# Patient Record
Sex: FEMALE | URBAN - METROPOLITAN AREA
[De-identification: names, ages, dates, MRNs, and addresses within clinical notes are randomized per-mention and may not be internally consistent; named-entity substitution may affect disease eponyms.]

---

## 2023-11-01 ENCOUNTER — ANCILLARY PROCEDURE (OUTPATIENT)
Dept: RADIOLOGY | Facility: CLINIC | Age: 53
End: 2023-11-01
Payer: COMMERCIAL

## 2023-11-01 DIAGNOSIS — Z13.6 ENCOUNTER FOR SCREENING FOR CARDIOVASCULAR DISORDERS: ICD-10-CM

## 2023-11-01 PROCEDURE — 75571 CT HRT W/O DYE W/CA TEST: CPT

## 2023-11-03 ENCOUNTER — TELEPHONE (OUTPATIENT)
Dept: PRIMARY CARE | Facility: CLINIC | Age: 53
End: 2023-11-03
Payer: COMMERCIAL

## 2023-11-03 PROBLEM — R73.01 IMPAIRED FASTING GLUCOSE: Status: ACTIVE | Noted: 2020-08-28

## 2023-11-03 PROBLEM — G47.33 OBSTRUCTIVE SLEEP APNEA: Status: ACTIVE | Noted: 2020-08-28

## 2023-11-03 PROBLEM — D12.6 TUBULAR ADENOMA OF COLON: Status: ACTIVE | Noted: 2021-08-13

## 2023-11-03 NOTE — TELEPHONE ENCOUNTER
CT calcium score completed.  Calcium score is 0.  Letter with copy of result has been sent to the patient.  Recommendations include continued low-fat diet, exercise, and modest weight loss to goal weight.    Rakesh Gates MD

## 2023-11-03 NOTE — LETTER
"November 3, 2023      Re: CT calcium scoring    Dear Stuart,    I have enclosed a copy of your recent CT calcium scoring report.  I am pleased to inform you that the calcium score is \"0\".  This result is very reassuring indicating cardiovascular risk for future heart attack is very low, 4% or less.    Continuing to maintain proper low-fat/cholesterol, high-fiber diet as well as regular aerobic exercise is recommended.    If you have any questions, feel free to contact the office at [phone].  Thank you and I hope all is well.      Sincerely,        Rakesh Gates MD  "